# Patient Record
Sex: MALE | Race: WHITE
[De-identification: names, ages, dates, MRNs, and addresses within clinical notes are randomized per-mention and may not be internally consistent; named-entity substitution may affect disease eponyms.]

---

## 2018-11-20 ENCOUNTER — HOSPITAL ENCOUNTER (OUTPATIENT)
Dept: HOSPITAL 74 - JASU-ENDO | Age: 58
Discharge: HOME | End: 2018-11-20
Attending: INTERNAL MEDICINE
Payer: COMMERCIAL

## 2018-11-20 VITALS — TEMPERATURE: 97.5 F

## 2018-11-20 VITALS — HEART RATE: 73 BPM | SYSTOLIC BLOOD PRESSURE: 122 MMHG | DIASTOLIC BLOOD PRESSURE: 78 MMHG

## 2018-11-20 VITALS — BODY MASS INDEX: 27 KG/M2

## 2018-11-20 DIAGNOSIS — K57.30: ICD-10-CM

## 2018-11-20 DIAGNOSIS — D12.3: ICD-10-CM

## 2018-11-20 DIAGNOSIS — K31.7: ICD-10-CM

## 2018-11-20 DIAGNOSIS — K21.0: ICD-10-CM

## 2018-11-20 DIAGNOSIS — Z51.11: Primary | ICD-10-CM

## 2018-11-20 DIAGNOSIS — K64.8: ICD-10-CM

## 2018-11-20 DIAGNOSIS — D51.0: ICD-10-CM

## 2018-11-20 DIAGNOSIS — K62.1: ICD-10-CM

## 2018-11-20 DIAGNOSIS — D12.4: ICD-10-CM

## 2018-11-20 PROCEDURE — 0DB68ZX EXCISION OF STOMACH, VIA NATURAL OR ARTIFICIAL OPENING ENDOSCOPIC, DIAGNOSTIC: ICD-10-PCS | Performed by: INTERNAL MEDICINE

## 2018-11-20 PROCEDURE — 0DBN8ZX EXCISION OF SIGMOID COLON, VIA NATURAL OR ARTIFICIAL OPENING ENDOSCOPIC, DIAGNOSTIC: ICD-10-PCS | Performed by: INTERNAL MEDICINE

## 2018-11-20 PROCEDURE — 0DBL8ZX EXCISION OF TRANSVERSE COLON, VIA NATURAL OR ARTIFICIAL OPENING ENDOSCOPIC, DIAGNOSTIC: ICD-10-PCS | Performed by: INTERNAL MEDICINE

## 2018-11-20 PROCEDURE — 0DBM8ZX EXCISION OF DESCENDING COLON, VIA NATURAL OR ARTIFICIAL OPENING ENDOSCOPIC, DIAGNOSTIC: ICD-10-PCS | Performed by: INTERNAL MEDICINE

## 2018-11-20 PROCEDURE — 0DBP8ZX EXCISION OF RECTUM, VIA NATURAL OR ARTIFICIAL OPENING ENDOSCOPIC, DIAGNOSTIC: ICD-10-PCS | Performed by: INTERNAL MEDICINE

## 2018-11-23 NOTE — PATH
Surgical Pathology Report



Patient Name:  GODFREY NAVARRO

Accession #:  [id number]

Med. Rec. #:  [id number]                                                        

   /Age/Gender:  1960 (Age: 58) / M

Account:  [id number]                                                          

             Location: [facility]

Taken:  2018

Received:  2018

Reported:  2018

Physicians:  Kadeem Landa D.O.

  



Specimen(s) Received

A: BX BODY 

B: BX GE JUNCTION POLYP 

C: BX TRANSVERSE COLON POLYP 

D: BX DESCENDING COLON POLYP 

E: RECTAL POLYP 





Clinical History

Pernicious anemia

Postoperative diagnosis: Reflux esophagitis, gastric and GE junction polyps,

diverticulosis, hemorrhoids







Final Diagnosis

A. GASTRIC BODY POLYP, BIOPSY:

FUNDIC GLAND POLYP.

IMMUNOSTAIN FOR H. PYLORI IS NEGATIVE.



B. GE JUNCTION POLYP, BIOPSY:

HYPERPLASTIC POLYP.

IMMUNOSTAIN FOR H. PYLORI IS NEGATIVE.



C. TRANSVERSE COLON POLYP X 3, POLYPECTOMY:

TUBULAR ADENOMA, MULTIPLE FRAGMENTS.



D. DESCENDING COLON POLYP, POLYPECTOMY:

TUBULAR ADENOMA.



E. RECTAL POLYP, POLYPECTOMY:

HYPERPLASTIC POLYP.







***Electronically Signed***

Susie Pollock M.D.





Gross Description

A.  Received in formalin, labeled "polyps gastric body of stomach" are 4 tan,

irregular portions of soft tissue ranging from 0.2-0.4 cm. in greatest

dimension. The specimens are submitted in toto in one cassette.



B.  Received in formalin, labeled "polyps at the GE junction" are 2 tan,

irregular portions of soft tissue measuring 0.2 and 0.4 cm. in greatest

dimension. The specimens are submitted in toto in one cassette.



C.  Received in formalin, labeled "transverse colon polyps" are 4 tan, irregular

portions of soft tissue ranging from 0.2-0.4 cm. in greatest dimension. The

specimens are submitted in toto in one cassette.



D.  Received in formalin, labeled "descending colon polyp" are 3 tan, irregular

portions of soft tissue ranging from 0.1-0.4 cm. in greatest dimension. The

specimens are submitted in toto in one cassette.



E.  Received in formalin, labeled "polyp rectum" is a tan, irregular portion of

soft tissue measuring 0.4 cm. in greatest dimension. The specimen is submitted

in toto in one cassette.





[facility]

## 2021-11-09 ENCOUNTER — HOSPITAL ENCOUNTER (OUTPATIENT)
Dept: HOSPITAL 74 - JASU-ENDO | Age: 61
Discharge: HOME | End: 2021-11-09
Attending: INTERNAL MEDICINE
Payer: COMMERCIAL

## 2021-11-09 VITALS — BODY MASS INDEX: 27.3 KG/M2

## 2021-11-09 VITALS — HEART RATE: 85 BPM | DIASTOLIC BLOOD PRESSURE: 58 MMHG | SYSTOLIC BLOOD PRESSURE: 113 MMHG

## 2021-11-09 VITALS — TEMPERATURE: 97.7 F

## 2021-11-09 DIAGNOSIS — K57.30: ICD-10-CM

## 2021-11-09 DIAGNOSIS — D12.3: ICD-10-CM

## 2021-11-09 DIAGNOSIS — Z12.11: Primary | ICD-10-CM

## 2021-11-09 DIAGNOSIS — D12.2: ICD-10-CM

## 2021-11-09 DIAGNOSIS — Z86.010: ICD-10-CM

## 2021-11-09 DIAGNOSIS — K64.8: ICD-10-CM

## 2021-11-09 PROCEDURE — 0DBK8ZX EXCISION OF ASCENDING COLON, VIA NATURAL OR ARTIFICIAL OPENING ENDOSCOPIC, DIAGNOSTIC: ICD-10-PCS | Performed by: INTERNAL MEDICINE

## 2021-11-09 PROCEDURE — 0DBL8ZX EXCISION OF TRANSVERSE COLON, VIA NATURAL OR ARTIFICIAL OPENING ENDOSCOPIC, DIAGNOSTIC: ICD-10-PCS | Performed by: INTERNAL MEDICINE

## 2025-01-06 ENCOUNTER — HOSPITAL ENCOUNTER (OUTPATIENT)
Dept: HOSPITAL 74 - JASU-ENDO | Age: 65
Discharge: HOME | End: 2025-01-06
Attending: INTERNAL MEDICINE
Payer: COMMERCIAL

## 2025-01-06 VITALS — SYSTOLIC BLOOD PRESSURE: 116 MMHG | DIASTOLIC BLOOD PRESSURE: 64 MMHG | HEART RATE: 77 BPM | RESPIRATION RATE: 18 BRPM

## 2025-01-06 VITALS — TEMPERATURE: 97.5 F

## 2025-01-06 VITALS — BODY MASS INDEX: 29.7 KG/M2

## 2025-01-06 DIAGNOSIS — Z12.11: Primary | ICD-10-CM

## 2025-01-06 DIAGNOSIS — K64.8: ICD-10-CM

## 2025-01-06 DIAGNOSIS — K63.89: ICD-10-CM

## 2025-01-06 DIAGNOSIS — Z86.0100: ICD-10-CM

## 2025-01-06 DIAGNOSIS — D12.2: ICD-10-CM

## 2025-01-06 DIAGNOSIS — D12.3: ICD-10-CM

## 2025-01-06 DIAGNOSIS — K57.30: ICD-10-CM

## 2025-01-06 PROCEDURE — 0DBK8ZX EXCISION OF ASCENDING COLON, VIA NATURAL OR ARTIFICIAL OPENING ENDOSCOPIC, DIAGNOSTIC: ICD-10-PCS | Performed by: INTERNAL MEDICINE

## 2025-01-06 PROCEDURE — 0DBL8ZX EXCISION OF TRANSVERSE COLON, VIA NATURAL OR ARTIFICIAL OPENING ENDOSCOPIC, DIAGNOSTIC: ICD-10-PCS | Performed by: INTERNAL MEDICINE
